# Patient Record
Sex: FEMALE | Race: WHITE
[De-identification: names, ages, dates, MRNs, and addresses within clinical notes are randomized per-mention and may not be internally consistent; named-entity substitution may affect disease eponyms.]

---

## 2021-07-03 ENCOUNTER — HOSPITAL ENCOUNTER (EMERGENCY)
Dept: HOSPITAL 8 - ED | Age: 61
Discharge: HOME | End: 2021-07-03
Payer: COMMERCIAL

## 2021-07-03 VITALS — DIASTOLIC BLOOD PRESSURE: 85 MMHG | SYSTOLIC BLOOD PRESSURE: 156 MMHG

## 2021-07-03 VITALS — HEIGHT: 61 IN | WEIGHT: 209.44 LBS | BODY MASS INDEX: 39.54 KG/M2
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
## 2021-07-03 DIAGNOSIS — S92.344A: Primary | ICD-10-CM

## 2021-07-03 DIAGNOSIS — S92.511A: ICD-10-CM
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
## 2021-07-03 PROCEDURE — 96372 THER/PROPH/DIAG INJ SC/IM: CPT

## 2021-07-03 PROCEDURE — 99283 EMERGENCY DEPT VISIT LOW MDM: CPT

## 2021-07-03 PROCEDURE — 73630 X-RAY EXAM OF FOOT: CPT

## 2022-04-01 NOTE — NUR
PT RESTING ON ANANT UNDERWOOD.
PT TO ROOM 23 W/ C/O MGLF YESTERDAY AFTER PT MISTEPPED ON STAIRS. PT DENIES 
HITTING HEAD/LOC. PT STATES SHE CONTINUED TO WALK ON IT AND AWOKE THIS AM W/ R 
LEG PAIN, BRUISING, SWELLING. PT RESTING ON GURNEY. NADN. CMS INTACT. MONITORS 
APPLIED. VSS. WARM BLANKET PROVIDED. CALL LIGHT IN REACH.
PT TO ROOM FROM LOBBY VIA WHEELCHAIR
[FreeTextEntry1] : Sinus  Rhythm 89